# Patient Record
Sex: MALE | Race: AMERICAN INDIAN OR ALASKA NATIVE
[De-identification: names, ages, dates, MRNs, and addresses within clinical notes are randomized per-mention and may not be internally consistent; named-entity substitution may affect disease eponyms.]

---

## 2019-07-29 ENCOUNTER — HOSPITAL ENCOUNTER (EMERGENCY)
Dept: HOSPITAL 5 - ED | Age: 39
LOS: 1 days | Discharge: HOME | End: 2019-07-30
Payer: SELF-PAY

## 2019-07-29 VITALS — DIASTOLIC BLOOD PRESSURE: 95 MMHG | SYSTOLIC BLOOD PRESSURE: 142 MMHG

## 2019-07-29 DIAGNOSIS — L02.31: Primary | ICD-10-CM

## 2019-07-29 PROCEDURE — 74176 CT ABD & PELVIS W/O CONTRAST: CPT

## 2019-07-29 PROCEDURE — 85027 COMPLETE CBC AUTOMATED: CPT

## 2019-07-29 PROCEDURE — 80053 COMPREHEN METABOLIC PANEL: CPT

## 2019-07-29 PROCEDURE — 36415 COLL VENOUS BLD VENIPUNCTURE: CPT

## 2019-07-30 LAB
ALBUMIN SERPL-MCNC: 3.9 G/DL (ref 3.9–5)
ALT SERPL-CCNC: 16 UNITS/L (ref 7–56)
BUN SERPL-MCNC: 7 MG/DL (ref 9–20)
BUN/CREAT SERPL: 9 %
CALCIUM SERPL-MCNC: 9.1 MG/DL (ref 8.4–10.2)
HCT VFR BLD CALC: 41.1 % (ref 35.5–45.6)
HEMOLYSIS INDEX: 8
HGB BLD-MCNC: 13.6 GM/DL (ref 11.8–15.2)
MCHC RBC AUTO-ENTMCNC: 33 % (ref 32–34)
MCV RBC AUTO: 87 FL (ref 84–94)
PLATELET # BLD: 345 K/MM3 (ref 140–440)
RBC # BLD AUTO: 4.72 M/MM3 (ref 3.65–5.03)
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
## 2019-07-30 NOTE — CAT SCAN REPORT
CT abdomen pelvis wo con 



INDICATION / CLINICAL INFORMATION:

Abcess in sacral area.



TECHNIQUE:

All CT scans at this location are performed using CT dose reduction for ALARA by means of automated e
xposure control. 



COMPARISON:

None available.



FINDINGS:

No free fluid is seen in the abdomen. The liver, spleen, kidneys, pancreas, adrenal glands and great 
vessels are normal. Postsurgical changes seen in the midabdomen.



In the pelvis, no free fluid is seen. No enlarged lymph nodes are identified. The bladder is normal. 
The appendix is not well visualized. There is induration seen in the subcutaneous tissues of the uppe
r gluteal fold there may be a tiny fluid collection present in this area. The gluteal muscles and the
 sacrum are unremarkable in appearance. No significant skeletal abnormality is identified.



IMPRESSION:

1. Inflammation and induration in the subcutaneous fat of the upper gluteal fold and lower presacral 
area there may be a tiny fluid collection in this area. The gluteal muscles appear to be unremarkable
 in appearance.

2. Postsurgical change in the mid abdomen



Signer Name: Ariel FRAZIER 

Signed: 7/30/2019 1:44 AM

 Workstation Name: [id number]